# Patient Record
Sex: FEMALE | URBAN - METROPOLITAN AREA
[De-identification: names, ages, dates, MRNs, and addresses within clinical notes are randomized per-mention and may not be internally consistent; named-entity substitution may affect disease eponyms.]

---
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
## 2023-08-08 ENCOUNTER — APPOINTMENT (RX ONLY)
Dept: URBAN - METROPOLITAN AREA CLINIC 12 | Facility: CLINIC | Age: 18
Setting detail: DERMATOLOGY
End: 2023-08-08

## 2023-08-08 DIAGNOSIS — Z41.1 ENCOUNTER FOR COSMETIC SURGERY: ICD-10-CM

## 2023-08-08 PROCEDURE — ? PATIENT SPECIFIC COUNSELING

## 2023-08-08 PROCEDURE — ? CONSULTATION - RHINOPLASTY

## 2023-08-08 ASSESSMENT — LOCATION ZONE DERM
LOCATION ZONE: NOSE
LOCATION ZONE: NOSE

## 2023-08-08 ASSESSMENT — LOCATION SIMPLE DESCRIPTION DERM
LOCATION SIMPLE: NOSE
LOCATION SIMPLE: NOSE

## 2023-08-08 ASSESSMENT — LOCATION DETAILED DESCRIPTION DERM
LOCATION DETAILED: NASAL DORSUM
LOCATION DETAILED: NASAL DORSUM

## 2023-08-08 NOTE — PROCEDURE: PATIENT SPECIFIC COUNSELING
Detail Level: Simple
Other (Free Text): N/p presents to clinic to see dr about a rhinoplasty revision. Patient had 2 surgeries done prior to this to reduce dorsal hump but neither surgeries worked. Dorsal hump came back both times, 2nd time the splint fell off on its own. Patient voiced that she is very resistant to local anesthesia. \\n\\nDr evaluated patient’s nose and voiced:\\n-slight shift in septum on both sides \\n-the reoccurrence of the bump is an exception situation \\n-anything they’ve seen a week out after prior surgery was swelling \\n-what they are seeing now, is unusual for Dr \\n-dr will open nose up, trim cartilage, shave the bone down \\n-recommends that steroid injections can help post surgery to help with persistent swelling or scaring inside the nose \\n- dr is not 100% sure if it will work in the patients situation \\n-explained that this is a personal decision if patient would like to have another surgery \\n- explains to patient that it would be better for the patient to do this surgery over the summer so that dr can see the patient for enough time post surgery to keep an eye out for it\\n-confirms that this surgery is very low risk\\n\\nPatient verbalized  understanding

## 2023-08-08 NOTE — HPI: NOSE (AESTHETIC DEFORMITY, NOSE)
How Severe Is It?: moderate
Is This A New Presentation, Or A Follow-Up?: Nasal Aesthetic Deformity
Additional History: Jennifer alvarez

## 2024-05-29 ENCOUNTER — APPOINTMENT (RX ONLY)
Dept: URBAN - METROPOLITAN AREA CLINIC 12 | Facility: CLINIC | Age: 19
Setting detail: DERMATOLOGY
End: 2024-05-29

## 2024-05-29 DIAGNOSIS — Z41.1 ENCOUNTER FOR COSMETIC SURGERY: ICD-10-CM

## 2024-05-29 PROCEDURE — ? OTHER (COSMETIC)

## 2024-05-29 PROCEDURE — ? PRE-OP WORKLIST

## 2024-05-29 ASSESSMENT — LOCATION ZONE DERM
LOCATION ZONE: NOSE
LOCATION ZONE: NOSE

## 2024-05-29 ASSESSMENT — LOCATION DETAILED DESCRIPTION DERM
LOCATION DETAILED: NASAL DORSUM
LOCATION DETAILED: NASAL DORSUM

## 2024-05-29 ASSESSMENT — LOCATION SIMPLE DESCRIPTION DERM
LOCATION SIMPLE: NOSE
LOCATION SIMPLE: NOSE

## 2024-05-29 NOTE — PROCEDURE: OTHER (COSMETIC)
Price $ (Use Numbers Only, No Text Please.): 0.00
Sticky Other (Free Text): No charge pre-op
Detail Level: Zone

## 2024-05-29 NOTE — PROCEDURE: PRE-OP WORKLIST
Photos Taken?: yes
Detail Level: Simple
Surgeon: Dr. Kike Helm
Surgery Scheduled: Closed reduction dorsal hump
Date Of Surgery: 6/6/24

## 2024-06-12 ENCOUNTER — APPOINTMENT (RX ONLY)
Dept: URBAN - METROPOLITAN AREA CLINIC 12 | Facility: CLINIC | Age: 19
Setting detail: DERMATOLOGY
End: 2024-06-12

## 2024-06-12 ENCOUNTER — RX ONLY (OUTPATIENT)
Age: 19
Setting detail: RX ONLY
End: 2024-06-12

## 2024-06-12 DIAGNOSIS — Z48.89 ENCOUNTER FOR OTHER SPECIFIED SURGICAL AFTERCARE: ICD-10-CM

## 2024-06-12 PROCEDURE — ? SUTURE REMOVAL

## 2024-06-12 PROCEDURE — ? COUNSELING - POST-OP CHECK

## 2024-06-12 PROCEDURE — ? PATIENT SPECIFIC COUNSELING

## 2024-06-12 PROCEDURE — 99024 POSTOP FOLLOW-UP VISIT: CPT

## 2024-06-12 PROCEDURE — ? POST-OP CHECK

## 2024-06-12 RX ORDER — MUPIROCIN 20 MG/G
OINTMENT TOPICAL DAILY
Qty: 22 | Refills: 0 | Status: ERX | COMMUNITY
Start: 2024-06-12

## 2024-06-12 ASSESSMENT — LOCATION DETAILED DESCRIPTION DERM
LOCATION DETAILED: NASAL SUPRATIP
LOCATION DETAILED: NASAL DORSUM

## 2024-06-12 ASSESSMENT — LOCATION ZONE DERM: LOCATION ZONE: NOSE

## 2024-06-12 ASSESSMENT — LOCATION SIMPLE DESCRIPTION DERM: LOCATION SIMPLE: NOSE

## 2024-06-12 NOTE — PROCEDURE: POST-OP CHECK
Detail Level: Detailed
Add Postop Global No-Charge Code (80204)?: yes
Other Dressings: paper tape
Wound Evaluated By: Dr. Kike Helm

## 2024-06-12 NOTE — PROCEDURE: PATIENT SPECIFIC COUNSELING
Detail Level: Zone
Other (Free Text): Patient presents s/p closed reduction dorsal hump on 6/6/24. Patient voiced she is doing well with no concerns and that her splint fell off by itself yesterday. \\n\\Fly Helm examined the patient and voiced that she is looking good. Dr. Helm voiced that the blister on the patient’s nose is due to rubbing the skin during surgery, and to apply mupiricin on it. Dr. Helm voiced the sutures are not ready for removal today and advised patient on how to apply the skin tape on her nose which it should not be changed often as long as it’s clean. Dr. Helm voiced patient is not ready for activity, just slow walking. Patient indicated understanding and to return on Monday 6/17 for suture removal.

## 2024-06-17 ENCOUNTER — APPOINTMENT (RX ONLY)
Dept: URBAN - METROPOLITAN AREA CLINIC 12 | Facility: CLINIC | Age: 19
Setting detail: DERMATOLOGY
End: 2024-06-17

## 2024-06-17 DIAGNOSIS — Z48.89 ENCOUNTER FOR OTHER SPECIFIED SURGICAL AFTERCARE: ICD-10-CM

## 2024-06-17 PROCEDURE — ? PATIENT SPECIFIC COUNSELING

## 2024-06-17 PROCEDURE — ? POST-OP CHECK

## 2024-06-17 PROCEDURE — 99024 POSTOP FOLLOW-UP VISIT: CPT

## 2024-06-17 PROCEDURE — ? SUTURE REMOVAL

## 2024-06-17 PROCEDURE — ? COUNSELING - POST-OP CHECK

## 2024-06-17 ASSESSMENT — LOCATION DETAILED DESCRIPTION DERM
LOCATION DETAILED: NASAL SUPRATIP
LOCATION DETAILED: NASAL DORSUM

## 2024-06-17 ASSESSMENT — LOCATION SIMPLE DESCRIPTION DERM: LOCATION SIMPLE: NOSE

## 2024-06-17 ASSESSMENT — LOCATION ZONE DERM: LOCATION ZONE: NOSE

## 2024-06-17 NOTE — PROCEDURE: POST-OP CHECK
Detail Level: Detailed
Add Postop Global No-Charge Code (96161)?: yes
Other Dressings: paper tape
Wound Evaluated By: Dr. Kike Helm

## 2024-06-17 NOTE — PROCEDURE: PATIENT SPECIFIC COUNSELING
Detail Level: Zone
Other (Free Text): Patient presents s/p closed reduction dorsal hump on 6/6/24. Patient is here for suture removal.\\n\\Fly Helm evaluated patient. Voiced her nose looks great. Sutures removed today. Advised patient to continue taping, and applying ointment inside her nose. Patient will follow up with us in 2 weeks on a Monday or Tuesday.

## 2024-07-02 ENCOUNTER — APPOINTMENT (RX ONLY)
Dept: URBAN - METROPOLITAN AREA CLINIC 12 | Facility: CLINIC | Age: 19
Setting detail: DERMATOLOGY
End: 2024-07-02

## 2024-07-02 DIAGNOSIS — Z48.89 ENCOUNTER FOR OTHER SPECIFIED SURGICAL AFTERCARE: ICD-10-CM

## 2024-07-02 PROCEDURE — ? INTRALESIONAL KENALOG (COSMETIC)

## 2024-07-02 PROCEDURE — 99024 POSTOP FOLLOW-UP VISIT: CPT

## 2024-07-02 PROCEDURE — ? POST-OP CHECK

## 2024-07-02 PROCEDURE — ? COUNSELING - POST-OP CHECK

## 2024-07-02 PROCEDURE — ? PATIENT SPECIFIC COUNSELING

## 2024-07-02 ASSESSMENT — LOCATION DETAILED DESCRIPTION DERM: LOCATION DETAILED: NASAL DORSUM

## 2024-07-02 ASSESSMENT — LOCATION ZONE DERM: LOCATION ZONE: NOSE

## 2024-07-02 ASSESSMENT — LOCATION SIMPLE DESCRIPTION DERM: LOCATION SIMPLE: NOSE

## 2024-07-02 NOTE — PROCEDURE: POST-OP CHECK
Detail Level: Detailed
Add Postop Global No-Charge Code (75055)?: yes
Wound Evaluated By: Dr. Kike Helm

## 2024-07-02 NOTE — PROCEDURE: INTRALESIONAL KENALOG (COSMETIC)
Detail Level: Detailed
Kenalog Preparation: kenalog with normal saline
Concentration (Mg/Ml): 5
Total Volume Injected (Ccs): 0.5
Administered By (Optional): Dr. Kike Helm
Price (Use Numbers Only, No Special Characters Or $): 0.00
Consent: The risks of atrophy were reviewed with the patient.

## 2024-07-02 NOTE — PROCEDURE: PATIENT SPECIFIC COUNSELING
Detail Level: Zone
Other (Free Text): Patient presents s/p closed reduction dorsal hump on 6/6/24. Patient voiced she feels a growth on the left side of her nose. \\n\\Fly Helm examined the patient and voiced there is swelling on the left side of her nose and he injected her nose with a Kenalog-10 injection today to help with the swelling. Dr. Helm voiced patient should continue taping her nose at night. Patient indicated understanding and to return in 3-4 weeks for another kenalog injection.

## 2024-07-22 ENCOUNTER — APPOINTMENT (RX ONLY)
Dept: URBAN - METROPOLITAN AREA CLINIC 12 | Facility: CLINIC | Age: 19
Setting detail: DERMATOLOGY
End: 2024-07-22

## 2024-07-22 DIAGNOSIS — Z48.89 ENCOUNTER FOR OTHER SPECIFIED SURGICAL AFTERCARE: ICD-10-CM

## 2024-07-22 PROBLEM — Z48.02 ENCOUNTER FOR REMOVAL OF SUTURES: Status: ACTIVE | Noted: 2024-07-22

## 2024-07-22 PROCEDURE — ? INTRALESIONAL KENALOG (COSMETIC)

## 2024-07-22 PROCEDURE — ? PATIENT SPECIFIC COUNSELING

## 2024-07-22 PROCEDURE — ? COUNSELING - POST-OP CHECK

## 2024-07-22 PROCEDURE — ? POST-OP CHECK

## 2024-07-22 PROCEDURE — 99024 POSTOP FOLLOW-UP VISIT: CPT

## 2024-07-22 ASSESSMENT — LOCATION ZONE DERM: LOCATION ZONE: NOSE

## 2024-07-22 ASSESSMENT — LOCATION DETAILED DESCRIPTION DERM: LOCATION DETAILED: NASAL DORSUM

## 2024-07-22 ASSESSMENT — LOCATION SIMPLE DESCRIPTION DERM: LOCATION SIMPLE: NOSE

## 2024-07-22 NOTE — PROCEDURE: PATIENT SPECIFIC COUNSELING
Detail Level: Zone
Other (Free Text): Patient presents s/p closed reduction dorsal hump on 6/6/24. Patient voiced feeling a suture inside her nose that is painful. Patient states that she feels scar tissue in her nostril as well.  Patient mentioned feeling no difference in the swelling from her last steroid infection. \\n\\nDr. Alicja evaluated patient and

## 2024-07-22 NOTE — PROCEDURE: POST-OP CHECK
Detail Level: Detailed
Add Postop Global No-Charge Code (91972)?: yes
Wound Evaluated By: Dr. Kike Helm

## 2024-08-12 ENCOUNTER — APPOINTMENT (RX ONLY)
Dept: URBAN - METROPOLITAN AREA CLINIC 12 | Facility: CLINIC | Age: 19
Setting detail: DERMATOLOGY
End: 2024-08-12

## 2024-08-12 DIAGNOSIS — Z48.89 ENCOUNTER FOR OTHER SPECIFIED SURGICAL AFTERCARE: ICD-10-CM

## 2024-08-12 PROCEDURE — ? COUNSELING - POST-OP CHECK

## 2024-08-12 PROCEDURE — ? INTRALESIONAL KENALOG (COSMETIC)

## 2024-08-12 PROCEDURE — ? POST-OP CHECK

## 2024-08-12 PROCEDURE — ? PATIENT SPECIFIC COUNSELING

## 2024-08-12 PROCEDURE — 99024 POSTOP FOLLOW-UP VISIT: CPT

## 2024-08-12 ASSESSMENT — LOCATION ZONE DERM: LOCATION ZONE: NOSE

## 2024-08-12 ASSESSMENT — LOCATION DETAILED DESCRIPTION DERM: LOCATION DETAILED: NASAL DORSUM

## 2024-08-12 ASSESSMENT — LOCATION SIMPLE DESCRIPTION DERM: LOCATION SIMPLE: NOSE

## 2024-08-12 NOTE — PROCEDURE: INTRALESIONAL KENALOG (COSMETIC)
Detail Level: Detailed
Kenalog Preparation: kenalog with normal saline
Concentration (Mg/Ml): 5
Total Volume Injected (Ccs): 0.7
Administered By (Optional): Dr. Kike Helm
Price (Use Numbers Only, No Special Characters Or $): 0.00
Consent: The risks of atrophy were reviewed with the patient.

## 2024-08-12 NOTE — PROCEDURE: POST-OP CHECK
Detail Level: Detailed
Add Postop Global No-Charge Code (24422)?: yes
Wound Evaluated By: Dr. Kike Helm

## 2024-08-12 NOTE — PROCEDURE: PATIENT SPECIFIC COUNSELING
Detail Level: Zone
Other (Free Text): Patient presents s/p closed reduction dorsal hump on 6/6/24. Patient voiced she feels a growth on the left side of her nose patient voiced that the steriod injections seem to help the hump on the top of her nose, but the sides still seem to be ra\\n\\nDrHang Helm examined the patient and voiced that a lot of what she’s feeling is her normal bone anatomy, however, there are some spots that can be treated today with small amounts of kenalog. Patient agreed and was treated with kenalog 10 straight. Patient tolerated the injection well. Dr. Helm advised patient to follow up in November when she returns from school.

## 2024-11-25 ENCOUNTER — APPOINTMENT (RX ONLY)
Dept: URBAN - METROPOLITAN AREA CLINIC 12 | Facility: CLINIC | Age: 19
Setting detail: DERMATOLOGY
End: 2024-11-25

## 2024-11-25 DIAGNOSIS — Z48.89 ENCOUNTER FOR OTHER SPECIFIED SURGICAL AFTERCARE: ICD-10-CM

## 2024-11-25 PROCEDURE — ? COUNSELING - POST-OP CHECK

## 2024-11-25 PROCEDURE — ? PATIENT SPECIFIC COUNSELING

## 2024-11-25 PROCEDURE — ? POST-OP CHECK

## 2024-11-25 PROCEDURE — 99024 POSTOP FOLLOW-UP VISIT: CPT

## 2024-11-25 PROCEDURE — ? INTRALESIONAL KENALOG (COSMETIC)

## 2024-11-25 ASSESSMENT — LOCATION DETAILED DESCRIPTION DERM: LOCATION DETAILED: NASAL DORSUM

## 2024-11-25 ASSESSMENT — LOCATION ZONE DERM: LOCATION ZONE: NOSE

## 2024-11-25 ASSESSMENT — LOCATION SIMPLE DESCRIPTION DERM: LOCATION SIMPLE: NOSE

## 2024-11-25 NOTE — PROCEDURE: POST-OP CHECK
Detail Level: Detailed
Add Postop Global No-Charge Code (75725)?: yes
Wound Evaluated By: Dr. Kike Helm

## 2024-11-25 NOTE — PROCEDURE: INTRALESIONAL KENALOG (COSMETIC)
Detail Level: Detailed
Kenalog Preparation: kenalog
Concentration (Mg/Ml): 10
Total Volume Injected (Ccs): 0.4
Lot # (Optional): [id number]
Expiration Date (Optional): 4/26
Administered By (Optional): Dr. Kike Helm
Price (Use Numbers Only, No Special Characters Or $): 0.00
Consent: The risks of atrophy were reviewed with the patient.

## 2024-11-25 NOTE — PROCEDURE: PATIENT SPECIFIC COUNSELING
Detail Level: Zone
Other (Free Text): Patient presents s/p closed reduction dorsal hump on 6/6/24 (tertiary surgery on nose). Patient voiced doing well, no questions or concerns. \\n\\Fly Helm examined the patient and voiced that her nose is looking really good. Pt would benefit from another steroid injection to continue to bring down the swelling. Dr. Helm advised patient to follow up in January . 0.4cc Kenalog-10 straight was injected.

## 2025-01-14 ENCOUNTER — APPOINTMENT (OUTPATIENT)
Dept: URBAN - METROPOLITAN AREA CLINIC 12 | Facility: CLINIC | Age: 20
Setting detail: DERMATOLOGY
End: 2025-01-14

## 2025-01-14 DIAGNOSIS — Z48.89 ENCOUNTER FOR OTHER SPECIFIED SURGICAL AFTERCARE: ICD-10-CM

## 2025-01-14 PROCEDURE — ? INTRALESIONAL KENALOG (COSMETIC)

## 2025-01-14 PROCEDURE — 99024 POSTOP FOLLOW-UP VISIT: CPT

## 2025-01-14 PROCEDURE — ? POST-OP CHECK

## 2025-01-14 PROCEDURE — ? PATIENT SPECIFIC COUNSELING

## 2025-01-14 PROCEDURE — ? COUNSELING - POST-OP CHECK

## 2025-01-14 ASSESSMENT — LOCATION DETAILED DESCRIPTION DERM: LOCATION DETAILED: NASAL DORSUM

## 2025-01-14 ASSESSMENT — LOCATION SIMPLE DESCRIPTION DERM: LOCATION SIMPLE: NOSE

## 2025-01-14 ASSESSMENT — LOCATION ZONE DERM: LOCATION ZONE: NOSE

## 2025-01-14 NOTE — PROCEDURE: POST-OP CHECK
Detail Level: Detailed
Add Postop Global No-Charge Code (39004)?: yes
Wound Evaluated By: Dr. Kike Helm

## 2025-01-14 NOTE — PROCEDURE: PATIENT SPECIFIC COUNSELING
Detail Level: Zone
Other (Free Text): Patient presents s/p closed reduction dorsal hump on 6/6/24 (tertiary surgery on nose). Patient voiced doing well, no questions or concerns. \\n\\Fly Helm examined the patient and voiced that her nose is looking really good. No need for another steroid injection. Everything looks great. RTC for 1 year post surgery follow up.

## 2025-05-12 ENCOUNTER — APPOINTMENT (OUTPATIENT)
Dept: URBAN - METROPOLITAN AREA CLINIC 207 | Age: 20
Setting detail: DERMATOLOGY
End: 2025-06-01

## 2025-05-12 DIAGNOSIS — L70.0 ACNE VULGARIS: ICD-10-CM

## 2025-05-12 PROCEDURE — 99214 OFFICE O/P EST MOD 30 MIN: CPT

## 2025-05-12 PROCEDURE — OTHER PRESCRIPTION: OTHER

## 2025-05-12 PROCEDURE — OTHER MIPS QUALITY: OTHER

## 2025-05-12 PROCEDURE — OTHER COUNSELING: OTHER

## 2025-05-12 PROCEDURE — OTHER PATIENT SPECIFIC COUNSELING: OTHER

## 2025-05-12 PROCEDURE — OTHER PRESCRIPTION MEDICATION MANAGEMENT: OTHER

## 2025-05-12 PROCEDURE — OTHER MEDICATION COUNSELING: OTHER

## 2025-05-12 RX ORDER — ADAPALENE AND BENZOYL PEROXIDE 3; 25 MG/G; MG/G
GEL TOPICAL
Qty: 60 | Refills: 3 | Status: ERX

## 2025-05-12 RX ORDER — CLASCOTERONE 1 G/100G
CREAM TOPICAL
Qty: 60 | Refills: 4 | Status: ERX | COMMUNITY
Start: 2025-05-12

## 2025-05-12 ASSESSMENT — LOCATION DETAILED DESCRIPTION DERM: LOCATION DETAILED: RIGHT INFERIOR MEDIAL FOREHEAD

## 2025-05-12 ASSESSMENT — LOCATION ZONE DERM: LOCATION ZONE: FACE

## 2025-05-12 ASSESSMENT — LOCATION SIMPLE DESCRIPTION DERM: LOCATION SIMPLE: RIGHT FOREHEAD

## 2025-06-30 ENCOUNTER — APPOINTMENT (OUTPATIENT)
Dept: URBAN - METROPOLITAN AREA CLINIC 207 | Age: 20
Setting detail: DERMATOLOGY
End: 2025-07-01

## 2025-06-30 DIAGNOSIS — L70.0 ACNE VULGARIS: ICD-10-CM

## 2025-06-30 PROCEDURE — OTHER MEDICATION COUNSELING: OTHER

## 2025-06-30 PROCEDURE — OTHER PRESCRIPTION MEDICATION MANAGEMENT: OTHER

## 2025-06-30 PROCEDURE — OTHER PRESCRIPTION: OTHER

## 2025-06-30 PROCEDURE — OTHER MIPS QUALITY: OTHER

## 2025-06-30 PROCEDURE — 99214 OFFICE O/P EST MOD 30 MIN: CPT

## 2025-06-30 PROCEDURE — OTHER COUNSELING: OTHER

## 2025-06-30 PROCEDURE — OTHER ORDER TESTS: OTHER

## 2025-06-30 RX ORDER — SPIRONOLACTONE 25 MG/1
TABLET, FILM COATED ORAL
Qty: 180 | Refills: 0 | Status: ERX | COMMUNITY
Start: 2025-06-30

## 2025-06-30 ASSESSMENT — LOCATION DETAILED DESCRIPTION DERM: LOCATION DETAILED: RIGHT INFERIOR MEDIAL FOREHEAD

## 2025-06-30 ASSESSMENT — LOCATION ZONE DERM: LOCATION ZONE: FACE

## 2025-06-30 ASSESSMENT — LOCATION SIMPLE DESCRIPTION DERM: LOCATION SIMPLE: RIGHT FOREHEAD

## 2025-08-06 ENCOUNTER — APPOINTMENT (OUTPATIENT)
Dept: URBAN - METROPOLITAN AREA CLINIC 207 | Age: 20
Setting detail: DERMATOLOGY
End: 2025-08-07

## 2025-08-06 ENCOUNTER — RX ONLY (RX ONLY)
Age: 20
End: 2025-08-06

## 2025-08-06 DIAGNOSIS — L57.8 OTHER SKIN CHANGES DUE TO CHRONIC EXPOSURE TO NONIONIZING RADIATION: ICD-10-CM

## 2025-08-06 DIAGNOSIS — D22 MELANOCYTIC NEVI: ICD-10-CM

## 2025-08-06 DIAGNOSIS — L70.0 ACNE VULGARIS: ICD-10-CM

## 2025-08-06 DIAGNOSIS — L81.4 OTHER MELANIN HYPERPIGMENTATION: ICD-10-CM

## 2025-08-06 DIAGNOSIS — D18.0 HEMANGIOMA: ICD-10-CM

## 2025-08-06 PROBLEM — D22.5 MELANOCYTIC NEVI OF TRUNK: Status: ACTIVE | Noted: 2025-08-06

## 2025-08-06 PROBLEM — D18.01 HEMANGIOMA OF SKIN AND SUBCUTANEOUS TISSUE: Status: ACTIVE | Noted: 2025-08-06

## 2025-08-06 PROCEDURE — OTHER LAB REPORTS REVIEWED: OTHER

## 2025-08-06 PROCEDURE — OTHER SUNSCREEN RECOMMENDATIONS: OTHER

## 2025-08-06 PROCEDURE — OTHER PRESCRIPTION MEDICATION MANAGEMENT: OTHER

## 2025-08-06 PROCEDURE — OTHER MIPS QUALITY: OTHER

## 2025-08-06 PROCEDURE — OTHER COUNSELING: OTHER

## 2025-08-06 PROCEDURE — OTHER MEDICATION COUNSELING: OTHER

## 2025-08-06 PROCEDURE — 99214 OFFICE O/P EST MOD 30 MIN: CPT

## 2025-08-06 RX ORDER — SPIRONOLACTONE 25 MG/1
TABLET, FILM COATED ORAL
Qty: 180 | Refills: 3 | Status: ERX

## 2025-08-06 ASSESSMENT — LOCATION DETAILED DESCRIPTION DERM
LOCATION DETAILED: RIGHT MEDIAL FOREHEAD
LOCATION DETAILED: LEFT ANTERIOR SHOULDER
LOCATION DETAILED: LEFT LATERAL SUPERIOR CHEST
LOCATION DETAILED: RIGHT SUPERIOR LATERAL MIDBACK
LOCATION DETAILED: LEFT SUPERIOR MEDIAL BUCCAL CHEEK
LOCATION DETAILED: RIGHT ANTERIOR SHOULDER
LOCATION DETAILED: MID TRAPEZIAL NECK
LOCATION DETAILED: UPPER STERNUM

## 2025-08-06 ASSESSMENT — LOCATION SIMPLE DESCRIPTION DERM
LOCATION SIMPLE: LEFT SHOULDER
LOCATION SIMPLE: RIGHT LOWER BACK
LOCATION SIMPLE: RIGHT SHOULDER
LOCATION SIMPLE: LEFT CHEEK
LOCATION SIMPLE: TRAPEZIAL NECK
LOCATION SIMPLE: CHEST
LOCATION SIMPLE: RIGHT FOREHEAD

## 2025-08-06 ASSESSMENT — LOCATION ZONE DERM
LOCATION ZONE: FACE
LOCATION ZONE: ARM
LOCATION ZONE: NECK
LOCATION ZONE: TRUNK